# Patient Record
Sex: FEMALE | Race: WHITE | NOT HISPANIC OR LATINO | URBAN - METROPOLITAN AREA
[De-identification: names, ages, dates, MRNs, and addresses within clinical notes are randomized per-mention and may not be internally consistent; named-entity substitution may affect disease eponyms.]

---

## 2018-06-25 ENCOUNTER — EMERGENCY (EMERGENCY)
Facility: HOSPITAL | Age: 78
LOS: 1 days | Discharge: HOME | End: 2018-06-25
Attending: EMERGENCY MEDICINE
Payer: MEDICARE

## 2018-06-25 VITALS
HEART RATE: 68 BPM | TEMPERATURE: 99 F | DIASTOLIC BLOOD PRESSURE: 83 MMHG | OXYGEN SATURATION: 97 % | SYSTOLIC BLOOD PRESSURE: 166 MMHG | HEIGHT: 63 IN | WEIGHT: 214.95 LBS | RESPIRATION RATE: 18 BRPM

## 2018-06-25 VITALS
HEART RATE: 87 BPM | SYSTOLIC BLOOD PRESSURE: 141 MMHG | DIASTOLIC BLOOD PRESSURE: 80 MMHG | OXYGEN SATURATION: 100 % | RESPIRATION RATE: 19 BRPM | TEMPERATURE: 98 F

## 2018-06-25 PROCEDURE — 70450 CT HEAD/BRAIN W/O DYE: CPT

## 2018-06-25 PROCEDURE — 70486 CT MAXILLOFACIAL W/O DYE: CPT | Mod: 26

## 2018-06-25 PROCEDURE — 73610 X-RAY EXAM OF ANKLE: CPT | Mod: 26,RT

## 2018-06-25 PROCEDURE — 70486 CT MAXILLOFACIAL W/O DYE: CPT

## 2018-06-25 PROCEDURE — 99284 EMERGENCY DEPT VISIT MOD MDM: CPT

## 2018-06-25 PROCEDURE — 71250 CT THORAX DX C-: CPT

## 2018-06-25 PROCEDURE — 72125 CT NECK SPINE W/O DYE: CPT | Mod: 26

## 2018-06-25 PROCEDURE — 71110 X-RAY EXAM RIBS BIL 3 VIEWS: CPT | Mod: 26

## 2018-06-25 PROCEDURE — 70450 CT HEAD/BRAIN W/O DYE: CPT | Mod: 26

## 2018-06-25 PROCEDURE — 71110 X-RAY EXAM RIBS BIL 3 VIEWS: CPT

## 2018-06-25 PROCEDURE — 73610 X-RAY EXAM OF ANKLE: CPT

## 2018-06-25 PROCEDURE — 72125 CT NECK SPINE W/O DYE: CPT

## 2018-06-25 PROCEDURE — 71250 CT THORAX DX C-: CPT | Mod: 26

## 2018-06-25 PROCEDURE — 99284 EMERGENCY DEPT VISIT MOD MDM: CPT | Mod: 25

## 2018-06-25 RX ORDER — ACETAMINOPHEN 500 MG
650 TABLET ORAL ONCE
Qty: 0 | Refills: 0 | Status: COMPLETED | OUTPATIENT
Start: 2018-06-25 | End: 2018-06-25

## 2018-06-25 RX ORDER — TRAMADOL HYDROCHLORIDE 50 MG/1
1 TABLET ORAL
Qty: 12 | Refills: 0 | OUTPATIENT
Start: 2018-06-25 | End: 2018-06-27

## 2018-06-25 RX ORDER — TRAMADOL HYDROCHLORIDE 50 MG/1
50 TABLET ORAL ONCE
Qty: 0 | Refills: 0 | Status: DISCONTINUED | OUTPATIENT
Start: 2018-06-25 | End: 2018-06-25

## 2018-06-25 RX ADMIN — TRAMADOL HYDROCHLORIDE 50 MILLIGRAM(S): 50 TABLET ORAL at 17:16

## 2018-06-25 RX ADMIN — Medication 650 MILLIGRAM(S): at 15:37

## 2018-06-25 NOTE — ED PROVIDER NOTE - CHPI ED SYMPTOMS POS
facial injury, left lower lip, dry abrasion inner aspect of lip ,  right ankle pain with rom/ABRASION

## 2018-06-25 NOTE — ED PROVIDER NOTE - PROGRESS NOTE DETAILS
results discussed, no fx ankle and ribs, ct chest neg, ct head, maxfacial and cervical spine neg, copy of results given, rx for tramadol sent to pharmacy,  advised follow up with pmd , given information for Dr Soto,  ace wrap applied to right ankle, cane given,  advised if any concerns return to ed.

## 2018-06-25 NOTE — ED PROVIDER NOTE - OBJECTIVE STATEMENT
77 y female presents s/p trip and fall at home today,  states she tripped on carpet 77 y female presents s/p trip and fall at home today,  states she tripped on carpet at home, fell forward,  states she hit her face on carpet, left lower lower lip is swollen, has dry abrasion inner aspect, has bilateral rib pain with movement,  no active bleeding from lip, no laceration,  states she had brief episode of epistaxis from left nare, resolved spontaneously, no nausea, no vomiting, no chest pain, no sob, no abdominal pain, no back pain, has mild headache, right ankle pain with rom,   takes asa daily.  PMD Dr Mccloud

## 2018-06-25 NOTE — ED PROVIDER NOTE - CHPI ED SYMPTOMS NEG
no weakness/no fever/no confusion/no deformity/no loss of consciousness/no numbness/no bleeding/no chest pain, no sob/no vomiting

## 2018-06-25 NOTE — ED PROVIDER NOTE - CARE PLAN
Principal Discharge DX:	Fall, initial encounter  Secondary Diagnosis:	Chest wall muscle strain, initial encounter  Secondary Diagnosis:	Acute right ankle pain

## 2019-08-06 ENCOUNTER — APPOINTMENT (OUTPATIENT)
Dept: ORTHOPEDIC SURGERY | Facility: CLINIC | Age: 79
End: 2019-08-06
Payer: MEDICARE

## 2019-08-06 VITALS
SYSTOLIC BLOOD PRESSURE: 91 MMHG | BODY MASS INDEX: 38.07 KG/M2 | WEIGHT: 223 LBS | HEART RATE: 72 BPM | HEIGHT: 64 IN | DIASTOLIC BLOOD PRESSURE: 55 MMHG

## 2019-08-06 PROCEDURE — 73030 X-RAY EXAM OF SHOULDER: CPT | Mod: RT

## 2019-08-06 PROCEDURE — 99204 OFFICE O/P NEW MOD 45 MIN: CPT

## 2019-08-06 NOTE — PHYSICAL EXAM
[Normal] : Oriented to person, place, and time, insight and judgement were intact and the affect was normal [de-identified] : Right Shoulder Exam\par \par Inspection: No malalignment, No defects\par Skin: No masses, No lesions\par Neck: Negative Spurlings, full ROM without pain\par ROM: RIGHT Active FF to 60°, abduction to 40°, ER to 5°, IR to abdomen. Severe pain with any range of motion.\par Tenderness: No bicipital tenderness, no tenderness to the greater tuberosity/RTC insertion, no anterior shoulder/lesser tuberosity tenderness\par Strength: 5/5 ER, 5/5 IR in adduction, 5/5 supraspinatus testing\par AC Joint: No ttp, no pain with cross arm testing\par Vasc: 2+ radial pulse\par Neuro: AIN, PIN, Ulnar nerve in tact to motor\par Sensation: In tact to light touch throughout\par \par  [de-identified] : 3 x-ray views of the right shoulder were obtained. Patient has evidence of severe glenohumeral arthritis.

## 2019-08-06 NOTE — ASSESSMENT
[FreeTextEntry1] : the patient is a 78-year-old female with chronic right shoulder pain secondary to severe osteoarthritis/rotator cuff arthropathy. Risks and benefits of continued conservative treatment versus surgical intervention for a reverse rotator cuff arthroplasty were discussed at length with patient she would like to think about these options further. I recommend a CT scan to evaluate the quality of the glenoid. She will follow up to review the results of the CT scan and to discuss treatment options further.

## 2019-08-06 NOTE — HISTORY OF PRESENT ILLNESS
[FreeTextEntry1] : 08/06/2019: The patient is a 78-year-old right-hand-dominant female who presents to the office today with severe right shoulder pain that has been going on for almost 10 years. The pain is dull and achy in nature and associated with severe limitations in range of motion. She is having difficulty with most of her activities of daily living. Her pain even disturbs her sleep. She recently had an MRI this past May of the right shoulder showing a full rotator cuff tear with associated glenohumeral arthritis. She denies any paresthesias.

## 2019-08-10 ENCOUNTER — MOBILE ON CALL (OUTPATIENT)
Age: 79
End: 2019-08-10

## 2019-09-10 ENCOUNTER — APPOINTMENT (OUTPATIENT)
Dept: ORTHOPEDIC SURGERY | Facility: CLINIC | Age: 79
End: 2019-09-10
Payer: MEDICARE

## 2019-09-10 VITALS
BODY MASS INDEX: 38.07 KG/M2 | HEIGHT: 64 IN | SYSTOLIC BLOOD PRESSURE: 141 MMHG | DIASTOLIC BLOOD PRESSURE: 66 MMHG | HEART RATE: 59 BPM | WEIGHT: 223 LBS

## 2019-09-10 PROCEDURE — 99214 OFFICE O/P EST MOD 30 MIN: CPT

## 2019-09-10 NOTE — HISTORY OF PRESENT ILLNESS
[FreeTextEntry1] : 08/06/2019: The patient is a 78-year-old right-hand-dominant female who presents to the office today with severe right shoulder pain that has been going on for almost 10 years. The pain is dull and achy in nature and associated with severe limitations in range of motion. She is having difficulty with most of her activities of daily living. Her pain even disturbs her sleep. She recently had an MRI this past May of the right shoulder showing a full rotator cuff tear with associated glenohumeral arthritis. She denies any paresthesias.\par \par 9/10/19: the patient returns today for followup of her right shoulder pain. She continues to have severe sharp pain in the right shoulder that radiates to the upper arm. It is worse with activity and improved with rest. She had a CT scan and presents to discuss surgical options.

## 2019-09-10 NOTE — PHYSICAL EXAM
[Normal Finger/nose] : finger to nose coordination [Normal RUE] : Right Upper Extremity: No scars, rashes, lesions, ulcers, skin intact [de-identified] : Right Shoulder Exam\par \par Inspection: No malalignment, No defects\par Skin: No masses, No lesions\par Neck: Negative Spurlings, full ROM without pain\par ROM: RIGHT Active FF to 60°, abduction to 40°, ER to 5°, IR to abdomen. Severe pain with any range of motion.\par Tenderness: No bicipital tenderness, no tenderness to the greater tuberosity/RTC insertion, no anterior shoulder/lesser tuberosity tenderness\par Strength: 5/5 ER, 5/5 IR in adduction, 5/5 supraspinatus testing\par AC Joint: No ttp, no pain with cross arm testing\par Vasc: 2+ radial pulse\par Neuro: AIN, PIN, Ulnar nerve in tact to motor\par Sensation: In tact to light touch throughout\par \par  [Normal] : no peripheral adenopathy appreciated [de-identified] : CT scan of theright shoulder is reviewed there is severe degenerative changes of the glenohumeral joint consistent with rotator cuff arthropathy, there is no abnormal glenoid bone loss

## 2019-09-10 NOTE — ASSESSMENT
[FreeTextEntry1] : the patient is a 78-year-old female with chronic right shoulder pain secondary to advanced rotator cuff arthropathy. Risks and benefits of continued conservative treatment versus surgical intervention for a reverse total shoulder arthroplasty were discussed at length with the patient her family, she wishes to proceed with surgery. Prior to booking surgery she would like to followup with her pain management doctor for a possible epidural injection for low back pain. She will call the office toschedule the procedure.

## 2021-01-29 NOTE — ED PROVIDER NOTE - ATTENDING CONTRIBUTION TO CARE
[Negative] : Genitourinary pt s/p trip and fall with ankle pain and rib pain.  CT claire for rib fracture.  Xray neg for fracture.  splint, cane, ortho f/u. incentive spirometer provided

## 2021-02-18 NOTE — ED PROVIDER NOTE - GASTROINTESTINAL NEGATIVE STATEMENT, MLM
Writer received incoming fax stating PA is needed for venlafaxine (EFFEXOR-XR) 37.5 MG 24 hr capsule: Take 2 capsules (75 mg) by mouth daily.    Per provider's last office visit note: Please note Effexor XR is now ordered with 37.5 mg capsule, so please take 2 capsules daily.  This is to prepare tapering off of the medication.    Pt has appt today at 10:30 am with provider. Will confirm if pt has lowered dose and whether Rx can be adjusted at that time to avoid insurance issues.     ID: [id number]   no abdominal pain, no bloating, no constipation, no diarrhea, no nausea and no vomiting.

## 2021-03-12 ENCOUNTER — APPOINTMENT (OUTPATIENT)
Dept: ORTHOPEDIC SURGERY | Facility: CLINIC | Age: 81
End: 2021-03-12
Payer: MEDICARE

## 2021-03-12 DIAGNOSIS — I63.9 CEREBRAL INFARCTION, UNSPECIFIED: ICD-10-CM

## 2021-03-12 DIAGNOSIS — E11.9 TYPE 2 DIABETES MELLITUS W/OUT COMPLICATIONS: ICD-10-CM

## 2021-03-12 DIAGNOSIS — I10 ESSENTIAL (PRIMARY) HYPERTENSION: ICD-10-CM

## 2021-03-12 DIAGNOSIS — M19.011 PRIMARY OSTEOARTHRITIS, RIGHT SHOULDER: ICD-10-CM

## 2021-03-12 DIAGNOSIS — M25.522 PAIN IN LEFT ELBOW: ICD-10-CM

## 2021-03-12 PROCEDURE — 73080 X-RAY EXAM OF ELBOW: CPT | Mod: 26,LT

## 2021-03-12 PROCEDURE — 99213 OFFICE O/P EST LOW 20 MIN: CPT

## 2021-03-12 NOTE — HISTORY OF PRESENT ILLNESS
[FreeTextEntry1] : 08/06/2019: The patient is a 78-year-old right-hand-dominant female who presents to the office today with severe right shoulder pain that has been going on for almost 10 years. The pain is dull and achy in nature and associated with severe limitations in range of motion. She is having difficulty with most of her activities of daily living. Her pain even disturbs her sleep. She recently had an MRI this past May of the right shoulder showing a full rotator cuff tear with associated glenohumeral arthritis. She denies any paresthesias.\par \par 9/10/19: the patient returns today for followup of her right shoulder pain. She continues to have severe sharp pain in the right shoulder that radiates to the upper arm. It is worse with activity and improved with rest. She had a CT scan and presents to discuss surgical options.\par \par 3/12/21: Patient returns for reevaluation of her right shoulder. She was last seen in 2019, and recommended for consideration for surgical intervention, with a reverse right shoulder arthroplasty. She reports she cancelled her surgery at that time, due to personal/family issues. She reports she would like to consider surgical intervention at this time, due to reports of constant pain, severe stiffness and limitations affecting her ADLs. \par She also reports she took a fall a couple days prior, and hit her medial left elbow against a door. She reports resolving ecchymosis, and mild pain. She denies restriction in motion/strength of the left elbow, denies paraesthesias.

## 2021-03-12 NOTE — PHYSICAL EXAM
[Normal RUE] : Right Upper Extremity: No scars, rashes, lesions, ulcers, skin intact [Normal Finger/nose] : finger to nose coordination [Normal] : no peripheral adenopathy appreciated [de-identified] : Right Shoulder Exam\par \par Inspection: No malalignment, No defects\par Skin: No masses, No lesions\par Neck: Negative Spurlings, full ROM without pain\par ROM: RIGHT Active FF to 50°, abduction to 30°, ER to 5°, IR to abdomen. Severe pain with any range of motion.\par Tenderness: No bicipital tenderness, no tenderness to the greater tuberosity/RTC insertion, no anterior shoulder/lesser tuberosity tenderness\par Strength: 5/5 ER, 5/5 IR in adduction, 4/5 supraspinatus testing\par AC Joint: No ttp, no pain with cross arm testing\par Vasc: 2+ radial pulse\par Neuro: AIN, PIN, Ulnar nerve in tact to motor\par Sensation: In tact to light touch throughout\par \par Left elbow exam\par Skin: Clean, dry, intact. mild resolving ecchymosis localized to the medial aspect of the elbow. No swelling. No palpable joint effusion.\par ROM: Full range of motion of bilateral elbows in flexion extension supination and pronation. \par Painful ROM: None\par Tenderness: no medial epicondyle pain. No lateral epicondyle pain. No olecranon pain. No pain at radial head.\par Strength: 5/5 elbow flexion, 5/5 elbow extension, 5/5 supination, 5/5 pronation\par Stability: Stable to varus/valgus stress\par Vasc: 2+ radial pulse, <2s cap refill\par Sensation: In tact to light touch throughout\par Neuro: Negative tinels at ulnar canal, AIN/PIN/Ulnar nerve in tact to motor/sensation.\par \par \par  [de-identified] : CT scan of the right shoulder is reviewed there is severe degenerative changes of the glenohumeral joint consistent with rotator cuff arthropathy, there is no abnormal glenoid bone loss\par \par Three views of the left elbow taken today reveal no acute fracture.

## 2021-03-12 NOTE — ASSESSMENT
[FreeTextEntry1] : 80-year-old female with chronic right shoulder pain secondary to advanced rotator cuff arthropathy and glenohumeral arthritis. Risks and benefits of continued conservative treatment versus surgical intervention for a reverse total shoulder arthroplasty were discussed at length with the patient. she wishes to proceed with surgery. \par Prior to booking surgery she will follow up in the beginning of May 20 with Dr. Kwan for preop evaluation. She will also discuss with her pain management doctor plan for surgery and evaluation of her neck and back.\par concerning her elbow she has been advised that this is a soft tissue contusion. She already notes improvement in her symptoms. She will followup as needed for the elbow.\par We will see her in May 2021 for consideration for reverse total shoulder replacement.

## 2021-05-25 ENCOUNTER — EMERGENCY (EMERGENCY)
Facility: HOSPITAL | Age: 81
LOS: 1 days | Discharge: HOME | End: 2021-05-25
Attending: EMERGENCY MEDICINE | Admitting: EMERGENCY MEDICINE
Payer: MEDICARE

## 2021-05-25 VITALS
RESPIRATION RATE: 17 BRPM | DIASTOLIC BLOOD PRESSURE: 77 MMHG | HEART RATE: 77 BPM | SYSTOLIC BLOOD PRESSURE: 137 MMHG | OXYGEN SATURATION: 97 %

## 2021-05-25 VITALS
HEART RATE: 60 BPM | OXYGEN SATURATION: 100 % | DIASTOLIC BLOOD PRESSURE: 82 MMHG | RESPIRATION RATE: 16 BRPM | SYSTOLIC BLOOD PRESSURE: 144 MMHG | TEMPERATURE: 98 F | HEIGHT: 63 IN

## 2021-05-25 PROCEDURE — 99284 EMERGENCY DEPT VISIT MOD MDM: CPT | Mod: 25

## 2021-05-25 PROCEDURE — 96375 TX/PRO/DX INJ NEW DRUG ADDON: CPT

## 2021-05-25 PROCEDURE — 99284 EMERGENCY DEPT VISIT MOD MDM: CPT

## 2021-05-25 PROCEDURE — 96374 THER/PROPH/DIAG INJ IV PUSH: CPT

## 2021-05-25 PROCEDURE — 82962 GLUCOSE BLOOD TEST: CPT

## 2021-05-25 RX ORDER — ONDANSETRON 8 MG/1
4 TABLET, FILM COATED ORAL ONCE
Refills: 0 | Status: DISCONTINUED | OUTPATIENT
Start: 2021-05-25 | End: 2021-05-28

## 2021-05-25 RX ORDER — KETOROLAC TROMETHAMINE 30 MG/ML
15 SYRINGE (ML) INJECTION ONCE
Refills: 0 | Status: DISCONTINUED | OUTPATIENT
Start: 2021-05-25 | End: 2021-05-25

## 2021-05-25 RX ORDER — DEXAMETHASONE 0.5 MG/5ML
5 ELIXIR ORAL ONCE
Refills: 0 | Status: COMPLETED | OUTPATIENT
Start: 2021-05-25 | End: 2021-05-25

## 2021-05-25 RX ORDER — HYDROMORPHONE HYDROCHLORIDE 2 MG/ML
0.5 INJECTION INTRAMUSCULAR; INTRAVENOUS; SUBCUTANEOUS ONCE
Refills: 0 | Status: DISCONTINUED | OUTPATIENT
Start: 2021-05-25 | End: 2021-05-25

## 2021-05-25 RX ADMIN — Medication 4 MILLIGRAM(S): at 12:02

## 2021-05-25 RX ADMIN — HYDROMORPHONE HYDROCHLORIDE 0.5 MILLIGRAM(S): 2 INJECTION INTRAMUSCULAR; INTRAVENOUS; SUBCUTANEOUS at 12:02

## 2021-05-25 RX ADMIN — Medication 15 MILLIGRAM(S): at 12:02

## 2021-05-25 NOTE — ED PROVIDER NOTE - CHPI ED SYMPTOMS NEG
no anorexia/no bladder dysfunction/no bowel dysfunction/no numbness/no tingling/no motor function loss

## 2021-05-25 NOTE — ED PROVIDER NOTE - CCCP TRG CHIEF CMPLNT
Hx of urinary retention and chronic UTI  - Requiring straight cath, can place harding   - c/w home bethanechol. Hold pyridium. back pain/injury

## 2021-05-25 NOTE — ED ADULT NURSE NOTE - NSIMPLEMENTINTERV_GEN_ALL_ED
Implemented All Fall with Harm Risk Interventions:  [place] to call system. Call bell, personal items and telephone within reach. Instruct patient to call for assistance. Room bathroom lighting operational. Non-slip footwear when patient is off stretcher. Physically safe environment: no spills, clutter or unnecessary equipment. Stretcher in lowest position, wheels locked, appropriate side rails in place. Provide visual cue, wrist band, yellow gown, etc. Monitor gait and stability. Monitor for mental status changes and reorient to person, place, and time. Review medications for side effects contributing to fall risk. Reinforce activity limits and safety measures with patient and family. Provide visual clues: red socks.

## 2021-05-25 NOTE — ED PROVIDER NOTE - CARE PLAN
Principal Discharge DX:	Low back pain without sciatica, unspecified back pain laterality, unspecified chronicity

## 2021-05-25 NOTE — ED ADULT NURSE NOTE - OBJECTIVE STATEMENT
Mid/lower back pain chronic.  Worsening lately.  No loss of bowel/bladder control.  No radiating to lower legs

## 2021-05-25 NOTE — ED PROVIDER NOTE - OBJECTIVE STATEMENT
81 yo white female with chronic back pain related to compression Fx at T11 as well as disc herniations lower lumbar spine now here for evaluation and management of worsening/flare of same pain in same location w/o any new symptoms. Was seen at Pain Management last week and started on Tramadol.

## 2021-05-25 NOTE — ED PROVIDER NOTE - PATIENT PORTAL LINK FT
You can access the FollowMyHealth Patient Portal offered by [facility] by registering at the following website: [email or web address]. By joining [facility]’s FollowMyHealth portal, you will also be able to view your health information using other applications (apps) compatible with our system.

## 2021-05-25 NOTE — ED PROVIDER NOTE - CARE PROVIDER_API CALL
Ermias Samuel (MD)  Anesthesiology; Pain Medicine  [address]  [place] [address]  Phone: [phone]  Fax: [phone]  Follow Up Time:

## 2021-06-28 ENCOUNTER — OUTPATIENT (OUTPATIENT)
Dept: OUTPATIENT SERVICES | Facility: HOSPITAL | Age: 81
LOS: 1 days | End: 2021-06-28
Payer: MEDICARE

## 2021-06-28 DIAGNOSIS — M54.16 RADICULOPATHY, LUMBAR REGION: ICD-10-CM

## 2021-06-28 LAB — GLUCOSE BLDC GLUCOMTR-MCNC: 105 MG/DL — HIGH (ref 70–99)

## 2021-06-28 PROCEDURE — 62323 NJX INTERLAMINAR LMBR/SAC: CPT

## 2021-06-28 PROCEDURE — 82962 GLUCOSE BLOOD TEST: CPT

## 2021-08-25 NOTE — ED ADULT TRIAGE NOTE - DOMESTIC TRAVEL HIGH RISK QUESTION
Ipledge Number (Optional): [id number] Patient Reported Weight (Optional - Include Units): 160 No Detail Level: Zone Anticipated Starting Dosage (Optional): 40mg Daily

## 2022-02-09 ENCOUNTER — EMERGENCY (EMERGENCY)
Facility: HOSPITAL | Age: 82
LOS: 1 days | Discharge: HOME | End: 2022-02-09
Attending: EMERGENCY MEDICINE
Payer: MEDICARE

## 2022-02-09 VITALS
DIASTOLIC BLOOD PRESSURE: 76 MMHG | SYSTOLIC BLOOD PRESSURE: 128 MMHG | WEIGHT: 192.9 LBS | HEART RATE: 61 BPM | OXYGEN SATURATION: 96 % | HEIGHT: 63 IN | TEMPERATURE: 98 F | RESPIRATION RATE: 18 BRPM

## 2022-02-09 PROCEDURE — 99284 EMERGENCY DEPT VISIT MOD MDM: CPT

## 2022-02-10 VITALS
TEMPERATURE: 98 F | HEART RATE: 63 BPM | SYSTOLIC BLOOD PRESSURE: 128 MMHG | OXYGEN SATURATION: 96 % | DIASTOLIC BLOOD PRESSURE: 69 MMHG | RESPIRATION RATE: 18 BRPM

## 2022-02-10 LAB
ALBUMIN SERPL ELPH-MCNC: 3.3 G/DL — SIGNIFICANT CHANGE UP (ref 3.3–5)
ALP SERPL-CCNC: 86 U/L — SIGNIFICANT CHANGE UP (ref 40–120)
ALT FLD-CCNC: 11 U/L — SIGNIFICANT CHANGE UP (ref 10–45)
ANION GAP SERPL CALC-SCNC: 10 MMOL/L — SIGNIFICANT CHANGE UP (ref 5–17)
AST SERPL-CCNC: 11 U/L — SIGNIFICANT CHANGE UP (ref 10–40)
BASE EXCESS BLDV CALC-SCNC: 7.5 MMOL/L — HIGH (ref -2–2)
BASOPHILS # BLD AUTO: 0.03 K/UL — SIGNIFICANT CHANGE UP (ref 0–0.2)
BASOPHILS NFR BLD AUTO: 0.3 % — SIGNIFICANT CHANGE UP (ref 0–2)
BILIRUB SERPL-MCNC: 0.3 MG/DL — SIGNIFICANT CHANGE UP (ref 0.2–1.2)
BUN SERPL-MCNC: 24 MG/DL — HIGH (ref 7–23)
CA-I SERPL-SCNC: 1.28 MMOL/L — SIGNIFICANT CHANGE UP (ref 1.15–1.33)
CALCIUM SERPL-MCNC: 9.6 MG/DL — SIGNIFICANT CHANGE UP (ref 8.4–10.5)
CHLORIDE BLDV-SCNC: 99 MMOL/L — SIGNIFICANT CHANGE UP (ref 96–108)
CHLORIDE SERPL-SCNC: 100 MMOL/L — SIGNIFICANT CHANGE UP (ref 96–108)
CO2 BLDV-SCNC: 36 MMOL/L — HIGH (ref 22–26)
CO2 SERPL-SCNC: 27 MMOL/L — SIGNIFICANT CHANGE UP (ref 22–31)
CREAT SERPL-MCNC: 1.8 MG/DL — HIGH (ref 0.5–1.3)
EOSINOPHIL # BLD AUTO: 0.2 K/UL — SIGNIFICANT CHANGE UP (ref 0–0.5)
EOSINOPHIL NFR BLD AUTO: 2 % — SIGNIFICANT CHANGE UP (ref 0–6)
GAS PNL BLDV: 136 MMOL/L — SIGNIFICANT CHANGE UP (ref 136–145)
GAS PNL BLDV: SIGNIFICANT CHANGE UP
GLUCOSE BLDV-MCNC: 311 MG/DL — HIGH (ref 70–99)
GLUCOSE SERPL-MCNC: 312 MG/DL — HIGH (ref 70–99)
HCO3 BLDV-SCNC: 34 MMOL/L — HIGH (ref 22–29)
HCT VFR BLD CALC: 34.6 % — SIGNIFICANT CHANGE UP (ref 34.5–45)
HCT VFR BLDA CALC: 34 % — LOW (ref 34.5–46.5)
HGB BLD CALC-MCNC: 11.3 G/DL — LOW (ref 11.7–16.1)
HGB BLD-MCNC: 11.2 G/DL — LOW (ref 11.5–15.5)
IMM GRANULOCYTES NFR BLD AUTO: 0.4 % — SIGNIFICANT CHANGE UP (ref 0–1.5)
LACTATE BLDV-MCNC: 1 MMOL/L — SIGNIFICANT CHANGE UP (ref 0.7–2)
LYMPHOCYTES # BLD AUTO: 1.8 K/UL — SIGNIFICANT CHANGE UP (ref 1–3.3)
LYMPHOCYTES # BLD AUTO: 17.8 % — SIGNIFICANT CHANGE UP (ref 13–44)
MCHC RBC-ENTMCNC: 29.3 PG — SIGNIFICANT CHANGE UP (ref 27–34)
MCHC RBC-ENTMCNC: 32.4 GM/DL — SIGNIFICANT CHANGE UP (ref 32–36)
MCV RBC AUTO: 90.6 FL — SIGNIFICANT CHANGE UP (ref 80–100)
MONOCYTES # BLD AUTO: 0.84 K/UL — SIGNIFICANT CHANGE UP (ref 0–0.9)
MONOCYTES NFR BLD AUTO: 8.3 % — SIGNIFICANT CHANGE UP (ref 2–14)
NEUTROPHILS # BLD AUTO: 7.18 K/UL — SIGNIFICANT CHANGE UP (ref 1.8–7.4)
NEUTROPHILS NFR BLD AUTO: 71.2 % — SIGNIFICANT CHANGE UP (ref 43–77)
NRBC # BLD: 0 /100 WBCS — SIGNIFICANT CHANGE UP (ref 0–0)
PCO2 BLDV: 56 MMHG — HIGH (ref 39–42)
PH BLDV: 7.39 — SIGNIFICANT CHANGE UP (ref 7.32–7.43)
PLATELET # BLD AUTO: 241 K/UL — SIGNIFICANT CHANGE UP (ref 150–400)
PO2 BLDV: 18 MMHG — LOW (ref 25–45)
POTASSIUM BLDV-SCNC: 3.4 MMOL/L — LOW (ref 3.5–5.1)
POTASSIUM SERPL-MCNC: 3.6 MMOL/L — SIGNIFICANT CHANGE UP (ref 3.5–5.3)
POTASSIUM SERPL-SCNC: 3.6 MMOL/L — SIGNIFICANT CHANGE UP (ref 3.5–5.3)
PROT SERPL-MCNC: 6.3 G/DL — SIGNIFICANT CHANGE UP (ref 6–8.3)
RBC # BLD: 3.82 M/UL — SIGNIFICANT CHANGE UP (ref 3.8–5.2)
RBC # FLD: 12.4 % — SIGNIFICANT CHANGE UP (ref 10.3–14.5)
SAO2 % BLDV: 32.4 % — LOW (ref 67–88)
SARS-COV-2 RNA SPEC QL NAA+PROBE: SIGNIFICANT CHANGE UP
SODIUM SERPL-SCNC: 137 MMOL/L — SIGNIFICANT CHANGE UP (ref 135–145)
WBC # BLD: 10.09 K/UL — SIGNIFICANT CHANGE UP (ref 3.8–10.5)
WBC # FLD AUTO: 10.09 K/UL — SIGNIFICANT CHANGE UP (ref 3.8–10.5)

## 2022-02-10 PROCEDURE — 83605 ASSAY OF LACTIC ACID: CPT

## 2022-02-10 PROCEDURE — 82330 ASSAY OF CALCIUM: CPT

## 2022-02-10 PROCEDURE — 87635 SARS-COV-2 COVID-19 AMP PRB: CPT

## 2022-02-10 PROCEDURE — 84295 ASSAY OF SERUM SODIUM: CPT

## 2022-02-10 PROCEDURE — 74176 CT ABD & PELVIS W/O CONTRAST: CPT | Mod: MA

## 2022-02-10 PROCEDURE — 73501 X-RAY EXAM HIP UNI 1 VIEW: CPT

## 2022-02-10 PROCEDURE — 85018 HEMOGLOBIN: CPT

## 2022-02-10 PROCEDURE — 85025 COMPLETE CBC W/AUTO DIFF WBC: CPT

## 2022-02-10 PROCEDURE — 82947 ASSAY GLUCOSE BLOOD QUANT: CPT

## 2022-02-10 PROCEDURE — 99284 EMERGENCY DEPT VISIT MOD MDM: CPT | Mod: 25

## 2022-02-10 PROCEDURE — 80053 COMPREHEN METABOLIC PANEL: CPT

## 2022-02-10 PROCEDURE — 72192 CT PELVIS W/O DYE: CPT | Mod: 26,MA,59

## 2022-02-10 PROCEDURE — 76377 3D RENDER W/INTRP POSTPROCES: CPT

## 2022-02-10 PROCEDURE — 84132 ASSAY OF SERUM POTASSIUM: CPT

## 2022-02-10 PROCEDURE — 85014 HEMATOCRIT: CPT

## 2022-02-10 PROCEDURE — 82435 ASSAY OF BLOOD CHLORIDE: CPT

## 2022-02-10 PROCEDURE — 82803 BLOOD GASES ANY COMBINATION: CPT

## 2022-02-10 PROCEDURE — 76377 3D RENDER W/INTRP POSTPROCES: CPT | Mod: 26

## 2022-02-10 PROCEDURE — 73501 X-RAY EXAM HIP UNI 1 VIEW: CPT | Mod: 26,RT

## 2022-02-10 PROCEDURE — 74176 CT ABD & PELVIS W/O CONTRAST: CPT | Mod: 26,MA

## 2022-02-10 RX ORDER — LIDOCAINE 4 G/100G
1 CREAM TOPICAL ONCE
Refills: 0 | Status: COMPLETED | OUTPATIENT
Start: 2022-02-10 | End: 2022-02-10

## 2022-02-10 RX ORDER — ACETAMINOPHEN 500 MG
2 TABLET ORAL
Qty: 32 | Refills: 0
Start: 2022-02-10 | End: 2022-02-13

## 2022-02-10 RX ORDER — OXYCODONE HYDROCHLORIDE 5 MG/1
1 TABLET ORAL
Qty: 7 | Refills: 0
Start: 2022-02-10 | End: 2022-02-12

## 2022-02-10 RX ORDER — ACETAMINOPHEN 500 MG
975 TABLET ORAL ONCE
Refills: 0 | Status: COMPLETED | OUTPATIENT
Start: 2022-02-10 | End: 2022-02-10

## 2022-02-10 RX ORDER — OXYCODONE HYDROCHLORIDE 5 MG/1
5 TABLET ORAL ONCE
Refills: 0 | Status: DISCONTINUED | OUTPATIENT
Start: 2022-02-10 | End: 2022-02-10

## 2022-02-10 RX ORDER — IBUPROFEN 200 MG
1 TABLET ORAL
Qty: 12 | Refills: 0
Start: 2022-02-10 | End: 2022-02-12

## 2022-02-10 RX ADMIN — OXYCODONE HYDROCHLORIDE 5 MILLIGRAM(S): 5 TABLET ORAL at 04:08

## 2022-02-10 RX ADMIN — LIDOCAINE 1 PATCH: 4 CREAM TOPICAL at 04:06

## 2022-02-10 RX ADMIN — Medication 975 MILLIGRAM(S): at 00:40

## 2022-02-10 NOTE — ED PROVIDER NOTE - PHYSICAL EXAMINATION
G: cooperative with exam   H: NCAT  E: EOMI, no conjunctival pallor   M: Mucous membranes moist   R: CTABL, nWOB  C: Nl S1/S2, no mrg  A: Soft, NT/ND, no rebound/guarding   MSK: no calf tenderness, no LE edema. Point TTP over sacral region, no erythema/bony deformity. Rectum w hemorrhoids

## 2022-02-10 NOTE — ED PROVIDER NOTE - NSFOLLOWUPINSTRUCTIONS_ED_ALL_ED_FT
-- Rest, ice, elevate area.  Apply ice to the area for 10 minutes every 2 hours for the first 2 days after the injury to reduce swelling.  -- If you have any worsening of symptoms, including severe pain/swelling/redness/numbness/changes in sensation/weakness/paralysis or any other concerns please return to the Emergency Department immediately.  -- Please follow up with your doctor(s) within the next 3 days, but seek medical care sooner if your symptoms persist or worsen.  Please call as soon as possible for an appointment.  If you cannot follow up with your doctor please return to the Emergency Department for any urgent issues.  -- You were given a copy of the results from any tests performed today in the Emergency Department which have results available.  Show these to your doctor(s).   Some of the tests we sent may not have results yet so please call or have your doctor call the Emergency Department to follow up on all results.  -- Please continue taking your home medications as directed.  Do not use alcohol when taking any medication (especially antibiotics, tylenol or other pain medication) unless you check with the doctor or pharmacist.    You can use 500-1000mg Tylenol every 6 hours for pain - as needed.  This is an over-the-counter medications - please respect the warnings on the label. This medication come with certain risks and side effects that you need to discuss with your doctor, especially if you are taking it for a prolonged period.    You can use 400-600mg Ibuprofen (such as motrin or advil) every 6 to 8 hours as needed for pain control.  Take ibuprofen with food or milk to lessen stomach upset.  This is an over-the-counter medication please respect the warnings on the label. All medications come with certain risks and side effects that you need to discuss with your doctor, especially if you are taking them for a prolonged period.    -- We have sent a prescription for Oxycodone directly to your pharmacy.  Please  the prescription as soon as possible & use as directed (1 pill every 6 hours ONLY as needed for SEVERE pain).  Try to use the Oxycodone as infrequently as possible - it's a narcotic & potentially addicting, especially if not taken as prescribed.  -- The narcotic in Oxycodone may cause drowsiness so don't drive, operate machinery or make important decisions while on this medication.  -- Oxycodone very often causes constipation.  So stay hydrated when taking Oxycodone. Your'e encouraged to use the stool softener Colace (also called docusate sodium – can be purchased without a prescription) 100mg 3 times a day to avoid constipation.

## 2022-02-10 NOTE — ED PROVIDER NOTE - ATTENDING CONTRIBUTION TO CARE
MD Torres:  patient seen and evaluated personally.   I agree with the History & Physical,  Impression & Plan other than what was detailed in my note.  MD Torres  80 y/o f MD Torres:  patient seen and evaluated personally.   I agree with the History & Physical,  Impression & Plan other than what was detailed in my note.  MD Torres  82 y/o f not on blood thinners s/p fall 1 week ago, mechanical, states slipped on sock, saw pcp s/p x rays but having persistent pain in between gluteal cleft, same pain since fall, workup was negative. states she did hit head but did not pass out, no ha, n/v, no neck pain, no back pain elsewhere, her pain is worse w/ movement, able to ambulate, sometimes uses a walker, afebrile vitals stable  non toxic well appearing, NC/AT no max face ttp,  conjunctiva non conjected, sclera anicteric PERRL, moist mucous membranes, neck supple, no midline c/t/l/spine ttp,  pt does have ttp over coccyx, heart sounds, normal, no mrg, lungs cta b/l no wrr, no chest ttp,  abd soft non distended w/ no tenderness, pelvis stable, no visual deformities of extremities, from of all joints, no ttp, axox3,  normal mood and affect, given pain pt is reporting in setting of neg x ray will get ct abd pelvis, pt also had some bruising and ttp over hip, likely muscular but will get x ray hip as well. re evaluate pain meds

## 2022-02-10 NOTE — ED PROVIDER NOTE - PROGRESS NOTE DETAILS
Payton Foreman MD (PGY2) -  Pt seen & reassessed.  Pt symptomatically improved.  NAD. Spoke to son and patient independently, they both voice understanding of clinical findings today.  We discussed the results of ED w/u w/patient (incl. presumptive Emergency Department dx, associated anticipatory guidance, stressing importance of prompt f/u, return precautions), & gave them a copy of results.  Patient verbalized understanding of ED course & agreed with our f/u recommendations, has decisional making capacity.  Pt st they will f/u w/PMD within the next 3 days; pt agrees to call today or tomorrow for an appointment. Pt agrees to return to the ED if there is any worsening or concerning symptoms. Attending Melissa:  spoke w/ radiology, sacral frx is minimaly displaced roughly 6 mm, pt pain improved, able to ambulate will give meds for pain control, have fu w/ spine

## 2022-02-10 NOTE — ED PROVIDER NOTE - OBJECTIVE STATEMENT
82yo F PMH CKD, DM, HTN presents d/t pain in her buttock. States she sustained a fall 1 wk ago. Tripped over her walker at that time. Went to PCP who obtained plain films, no fractures at that time. Has had increasing difficulty walking since then. No F/C/NS/N/V/D.

## 2022-02-10 NOTE — ED PROVIDER NOTE - PATIENT PORTAL LINK FT
You can access the FollowMyHealth Patient Portal offered by [facility] by registering at the following website: [email or web address]. By joining [facility]’s FollowMyHealth portal, you will also be able to view your health information using other applications (apps) compatible with our system.

## 2022-02-10 NOTE — ED PROVIDER NOTE - NS ED ROS FT
Gen: No F/C/NS  Head: +fall  Eyes: No changes in vision   Resp: No cough or trouble breathing  Cardiovascular: No chest pain    Gastroenteric: No N/V/D  :  No change in urine output, dysuria or hematuria   MS: +buttock pain   Neuro: No headache   Skin: No new rash

## 2022-02-10 NOTE — ED PROVIDER NOTE - SPECIALTY CARE
Headache, Unspecified    Headaches can be caused by a number of things. The cause of your headache isn’t clear. But it doesn’t seem to be a sign of any serious illness.  You could have a tension headache or a migraine headache.  Stress can cause a tension headache. This can happen if you tense the muscles of your shoulders, neck, and scalp without knowing it. If this stress lasts long enough, you may develop a tension headache.  It is not clear why migraines occur, but transient factors called\" triggers\" can raise the risk of having a migraine attack. Migraine triggers may include emotional stress or depression, or by hormone changes during the menstrual cycle. Other triggers include birth control pills and other medicines, alcohol or caffeine, foods with tyramine, eye strain, weather changes, missed meals, and lack of sleep or oversleeping.  Other causes of headache include:  · Viral illness with high fever  · Head injury with concussion  · Sinus, ear, or throat infection  · Dental pain and jaw joint (TMJ) pain  More serious but less common causes of headache include stroke, brain hemorrhage, brain tumor, meningitis, and encephalitis.  Home care  Follow these tips when taking care of yourself at home:  · Don’t drive yourself home if you were given pain medicine for your headache. Instead, have someone else drive you home. Try to sleep when you get home. You should feel much better when you wake up.  · Apply heat to the back of your neck to ease a neck muscle spasm. Take care of a migraine headache by putting an ice pack on your forehead or at the base of your skull.  · If you have nausea or vomiting, eat a light diet until your headache eases.  · If you have a migraine headache, use sunglasses when in the daylight or around bright indoor lighting until your symptoms get better. Bright glaring light can make this type of headache worse.  Follow-up care  Follow up with your health care provider if your headache  doesn’t get better within the next 24 hours. Talk with your provider If you have frequent headaches. He or she can help figure out a treatment plan. By knowing the earliest signs of headache, and starting treatment right away, you may be able to stop the pain yourself.  When to seek medical advice  Call your health care provider right away if any of these occur:  · Your head pain gets worse  · Your head pain doesn’t get better within 24 hours  · You aren’t able to keep liquids down (repeated vomiting)  · Fever of 100.4ºF (38ºC) or higher, or as directed by your health care provider  · Stiff neck  · Extreme drowsiness, confusion, or fainting  · Dizziness or dizziness with spinning sensation (vertigo)  · Weakness in an arm or leg or one side of your face  · You have difficulty talking or seeing  © 6455-0088 [employer]. [address] [place] [address], [place] [address]. All rights reserved. This information is not intended as a substitute for professional medical care. Always follow your healthcare professional's instructions.         Gastroenterology

## 2022-02-10 NOTE — ED PROVIDER NOTE - NSFOLLOWUPCLINICS_GEN_ALL_ED_FT
[facility]  Gastroenterology  [address], [facility] [address]  [place] [address]  Phone: [phone]  Fax:     [facility]  Orthopedic Surgery  [address]  [place] [address]  Phone: [phone]  Fax:

## 2022-02-10 NOTE — ED ADULT NURSE NOTE - OBJECTIVE STATEMENT
patient is an 80 y/o F with hx of HTN, HLD, and DM BIBEMS from assisted living facility c/o pain s/p mechanical fall 1 week ago. patient states that she tripped over her walker ~1 week ago and landed on her butt. patient has been experiencing pain and was seen outpatient by her PCP and XRays of her back and buttocks were negative. patient states that she has been having difficulty ambulating and performing her ADLs. patient states that she normally ambulates in her room with walker however has not been able to do so. patient is a&ox4, PERRL. strong peripheral pulses, strong extremities x4. respirations even and unlabored with symmetrical chest rise. abdomen soft and nondistended. skin warm, dry, and intact. patient resting in NAD. CLINE. IV access obtained. MD Martínez at bedside to assess. patient updated on plan of care. bed locked and placed in lowest position with side rails up and call bell within reach. comfort and safety maintained

## 2022-02-10 NOTE — ED PROVIDER NOTE - CLINICAL SUMMARY MEDICAL DECISION MAKING FREE TEXT BOX
80yo F PMH CKD, DM, HTN presents d/t pain in her buttock. Point tenderness over sacral region. Plan to obtain CT, labs, reassess.

## 2022-02-11 NOTE — ED POST DISCHARGE NOTE - RESULT SUMMARY
Incidental/recommended value f/u. CT findings noted as listed. Per ED documentation all results were d/w pt and son. Pt given ortho f/u for spine findings and GI referral/f/u for further eval of the possibly proctitis. Further ED contact at this time would not change recommendations already given. - Sacha Wahl PA-C

## 2022-03-21 ENCOUNTER — EMERGENCY (EMERGENCY)
Facility: HOSPITAL | Age: 82
LOS: 1 days | Discharge: HOME | End: 2022-03-21
Attending: EMERGENCY MEDICINE
Payer: MEDICARE

## 2022-03-21 VITALS
HEIGHT: 63 IN | SYSTOLIC BLOOD PRESSURE: 161 MMHG | OXYGEN SATURATION: 97 % | TEMPERATURE: 98 F | DIASTOLIC BLOOD PRESSURE: 76 MMHG | WEIGHT: 149.91 LBS | HEART RATE: 70 BPM | RESPIRATION RATE: 16 BRPM

## 2022-03-21 VITALS
SYSTOLIC BLOOD PRESSURE: 160 MMHG | TEMPERATURE: 98 F | OXYGEN SATURATION: 95 % | RESPIRATION RATE: 16 BRPM | HEART RATE: 66 BPM | DIASTOLIC BLOOD PRESSURE: 77 MMHG

## 2022-03-21 PROCEDURE — 72170 X-RAY EXAM OF PELVIS: CPT

## 2022-03-21 PROCEDURE — 73562 X-RAY EXAM OF KNEE 3: CPT | Mod: 26,RT

## 2022-03-21 PROCEDURE — 70450 CT HEAD/BRAIN W/O DYE: CPT | Mod: MA

## 2022-03-21 PROCEDURE — 73562 X-RAY EXAM OF KNEE 3: CPT

## 2022-03-21 PROCEDURE — 72170 X-RAY EXAM OF PELVIS: CPT | Mod: 26

## 2022-03-21 PROCEDURE — 99284 EMERGENCY DEPT VISIT MOD MDM: CPT | Mod: 25

## 2022-03-21 PROCEDURE — 99284 EMERGENCY DEPT VISIT MOD MDM: CPT | Mod: FS

## 2022-03-21 PROCEDURE — 70450 CT HEAD/BRAIN W/O DYE: CPT | Mod: 26,MA

## 2022-03-21 RX ORDER — ACETAMINOPHEN 500 MG
975 TABLET ORAL ONCE
Refills: 0 | Status: COMPLETED | OUTPATIENT
Start: 2022-03-21 | End: 2022-03-21

## 2022-03-21 RX ADMIN — Medication 975 MILLIGRAM(S): at 20:29

## 2022-03-21 NOTE — ED PROVIDER NOTE - PROGRESS NOTE DETAILS
CT brain revealed no acute findings. 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 Discussed these with patient. Cammy Beebe PA-C Attending MD Myers: Patient re-evaluated and feeling improved.  No acute issues at  this time.  Radiology tests reviewed with patient including incidental findings.  Patient stable for discharge. Follow up instructions given, importance of follow up emphasized, return to ED parameters reviewed and patient verbalized understanding.  All questions answered, all concerns addressed.

## 2022-03-21 NOTE — ED PROVIDER NOTE - PATIENT PORTAL LINK FT
You can access the FollowMyHealth Patient Portal offered by [facility] by registering at the following website: [email or web address]. By joining [facility]’s FollowMyHealth portal, you will also be able to view your health information using other applications (apps) compatible with our system.

## 2022-03-21 NOTE — ED PROVIDER NOTE - PHYSICAL EXAMINATION
CONSTITUTIONAL: Patient is awake, alert and oriented x 3. Patient is well appearing and in no acute distress.  HEAD: NCAT  EYES: PERRL bilaterally, EOMI,   NECK: Supple,   LUNGS: CTA B/L, no wheezes, rhonci or rales  HEART: RRR.+S1S2 no murmurs,   ABDOMEN: Soft, non-tender to palpation throughout all four quadrants,   MSK: No edema or calf tenderness b/l, FROM upper and lower ext b/l, (+) right knee ttp, (-) midline neck/spine tenderess to palpation, (+) hematoma to posterior occipital scalp;   SKIN: No rash or lesions  NEURO: No focal deficits, Strength5/5 UE and LE b/l; Sensation intact;

## 2022-03-21 NOTE — ED PROVIDER NOTE - NSFOLLOWUPINSTRUCTIONS_ED_ALL_ED_FT
YOU WERE SEEN IN THE ED FOR: head trauma    WHILE YOU WERE HERE, YOU HAD: a CT of your head and XRay of your right knee and pelvis which were non actionable.  Your CT showed incidental findings which are:  - Slightly increased prominence of right basal ganglia mineralization/calcification, nonspecific.  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   The radiologist recommends a non emergent ultrasound.  Please follow up with your primary care doctor for this issue.    FOR PAIN, YOU MAY TAKE TYLENOL (Acetaminophen).  WHILE HERE YOU RECEIVED 975mg of TYLENOL. FOLLOW THE INSTRUCTIONS ON THE LABEL/CONTAINER.    PLEASE FOLLOW UP WITH YOUR PRIVATE PHYSICIAN DR MENDOZA WITHIN THE NEXT 48 HOURS. BRING COPIES OF YOUR RESULTS.    RETURN TO THE EMERGENCY DEPARTMENT IF YOU EXPERIENCE ANY NEW/CONCERNING/WORSENING SYMPTOMS SUCH AS BUT NOT LIMITED TO: numbness, weakness or tingling in extremities, severe headache, change in vision, double vision, sudden loss of vision, chest pain, shortness of breath or any new concerns.

## 2022-03-21 NOTE — ED PROVIDER NOTE - ATTENDING CONTRIBUTION TO CARE
Attending MD Myers:   I personally have seen and examined this patient.  Physician assistant note reviewed and agree on plan of care and except where noted.  See below for details.     seen in [facility]    81F with PMH/PSH including HTN, HLD, DM, ambulates with walker at baseline, arthritis of R knee presents to the ED brought in by EMS s/p fall.  Reports that she was standing holding onto her walker and a table when she went to sit back in a chair, missed chair and fell backwards.  Reports that she hit her head, ?against walker.  Denies preceding dizziness, weakness, sensory changes.  Denies LOC. Reports that she has chronic pain at her R knee and difficult to say if she is having new pain at the R knee.  Denies chest pain, shortness of breath, abdominal pain, nausea, vomiting, diarrhea, urinary complaints, change in vision, neck pain, back pain.  Denies fevers, recent illness.  A ten (10) point review of systems was negative other than as stated in the HPI or elsewhere in the chart.     Exam:   General: NAD  HENT: head NCAT, airway patent with no blood in oropharynx or dried blood at nares  Eyes: PERRL, EOMI  Lungs: lungs CTAB with good inspiratory effort, no wheezing, no rhonchi, no rales  Cardiac: +S1S2, no m/r/g  GI: abdomen soft with +BS, NT, ND  : no CVAT  MSK: FROM at neck, no tenderness to midline palpation, no stepoffs along length of spine, no gross deformities of extremities, FROM at bilateral hips and knees without erythema or warmth  Neuro: moving all extremities spontaneously with 5/5 strength, sensory grossly intact, no gross neuro deficits  Skin: no abrasions or lacerations noted  Psych: normal mood and affect     A/P: 81F s/p fall, with head trauma, will obtain CTH to eval for traumatic ICH, will obtain XR R knee and pelvis to eval for new bony injury, suspect will see arthritic changes to knee, declined pain control

## 2022-03-21 NOTE — ED PROVIDER NOTE - OBJECTIVE STATEMENT
80 y/o female with PMHx of HTN, HLD, DM presents to the ED BIBA s/p mechanical trip and fall. Patient states that today she was at her assisted living facility. She was trying to sit back in a chair when she accidently missed her chair and fell straight backwards. Admits to head trauma but denies LOC. She normally ambulates with a walker. She has not tried to ambulate since her fall. She denies any pain at this time. No recent illness, headache, fever, chills, chest pain, abdominal pain, n/v/d.

## 2022-03-21 NOTE — ED PROVIDER NOTE - BIRTH SEX
Pt had gastroenteritis around 1 week ago and since then she had been having more nausea. She had diarrhea 1 week ago and lately she had constipation. Pt still has upper abdominal discomfort. On exam. Abdomen: soft, Non tender, non distended. Will check liver function tests.  Pt just finished her po antibiotics. Urine culture came back negative from 04/10/18. Will do another urine culture since urine dip stick showed positive nitrites but all else negative. Follow up in 3 weeks with DEMAR.   Female

## 2022-03-21 NOTE — ED PROVIDER NOTE - NS ED ATTENDING STATEMENT MOD
This was a shared visit with the JOSE ANTONIO. I reviewed and verified the documentation and independently performed the documented:

## 2022-03-21 NOTE — ED ADULT NURSE NOTE - OBJECTIVE STATEMENT
pt 82 yo female via ems to [facility] after mechanical fall from Assisted living pt alert oriented on arrival states going to sit back in chair walker missed seat fell back hit head no LOC no visible bleeding pt states some little discomfort to right outer leg where she hit walker no wound noted and some right side shoulder discomfort pt states when ems picked her up motor sensory intact to extremities

## 2022-03-21 NOTE — ED ADULT NURSE REASSESSMENT NOTE - NS ED NURSE REASSESS COMMENT FT1
Report received from Sammie VU. Pt resting comfortably without complaints & does not appear to be in any acute distress at this time with VSS. Perineal care provided, diaper changed, & assisted pt to change out of hospital gown & into street clothes to prepare for d/c home per [facility] approval. Pt states she is ready for d/c home.

## 2022-06-15 NOTE — ED PROVIDER NOTE - DISPOSITION TYPE
Is This A New Presentation, Or A Follow-Up?: Nail Disorder How Severe Is It?: moderate Additional History: Patient voices concerns over possible nail fungus. DISCHARGE

## 2022-09-27 ENCOUNTER — EMERGENCY (EMERGENCY)
Facility: HOSPITAL | Age: 82
LOS: 1 days | Discharge: HOME | End: 2022-09-27
Attending: EMERGENCY MEDICINE | Admitting: EMERGENCY MEDICINE
Payer: MEDICARE

## 2022-09-27 VITALS
DIASTOLIC BLOOD PRESSURE: 52 MMHG | SYSTOLIC BLOOD PRESSURE: 92 MMHG | HEART RATE: 74 BPM | RESPIRATION RATE: 18 BRPM | OXYGEN SATURATION: 97 % | TEMPERATURE: 98 F

## 2022-09-27 VITALS
WEIGHT: 179.9 LBS | SYSTOLIC BLOOD PRESSURE: 130 MMHG | HEART RATE: 69 BPM | DIASTOLIC BLOOD PRESSURE: 66 MMHG | HEIGHT: 63 IN | OXYGEN SATURATION: 99 % | TEMPERATURE: 98 F | RESPIRATION RATE: 18 BRPM

## 2022-09-27 PROCEDURE — 96374 THER/PROPH/DIAG INJ IV PUSH: CPT

## 2022-09-27 PROCEDURE — 99284 EMERGENCY DEPT VISIT MOD MDM: CPT | Mod: 25

## 2022-09-27 PROCEDURE — 99284 EMERGENCY DEPT VISIT MOD MDM: CPT

## 2022-09-27 RX ORDER — DIAZEPAM 5 MG
2 TABLET ORAL ONCE
Refills: 0 | Status: DISCONTINUED | OUTPATIENT
Start: 2022-09-27 | End: 2022-09-27

## 2022-09-27 RX ORDER — MORPHINE SULFATE 50 MG/1
2 CAPSULE, EXTENDED RELEASE ORAL ONCE
Refills: 0 | Status: COMPLETED | OUTPATIENT
Start: 2022-09-27 | End: 2022-09-27

## 2022-09-27 RX ORDER — SODIUM CHLORIDE 9 MG/ML
3 INJECTION INTRAMUSCULAR; INTRAVENOUS; SUBCUTANEOUS ONCE
Refills: 0 | Status: COMPLETED | OUTPATIENT
Start: 2022-09-27 | End: 2022-09-27

## 2022-09-27 RX ADMIN — Medication 2 MILLIGRAM(S): at 15:21

## 2022-09-27 RX ADMIN — SODIUM CHLORIDE 3 MILLILITER(S): 9 INJECTION INTRAMUSCULAR; INTRAVENOUS; SUBCUTANEOUS at 15:00

## 2022-09-27 NOTE — ED PROVIDER NOTE - OBJECTIVE STATEMENT
Patient came into the ED from assisted living c/o left lower back pain since last night. pain worse with movement. states she's had similar pain in the past that has been relieved in the hospital with morphine. no FUD. no fever. no N/V. she has tried tylenol and a lidocaine patch without relief. no trauma/fall.

## 2022-09-27 NOTE — ED PROVIDER NOTE - PROGRESS NOTE DETAILS
patient sleeping. patient awake and alert, and states her pain had improved and she was able to sleep but it is now returning. morphine ordered. family at bedside states patient has chronic pain and has been seen by many with epiderals, PT, etc. daughter aware of my concern about holding off NSAIDs. recommended to use ice. don't want to d/c with narcotics. patient to go home with daughter and discuss getting PT at assisted living.

## 2022-09-27 NOTE — ED ADULT NURSE NOTE - CHPI ED NUR SYMPTOMS NEG
no bladder dysfunction/no bowel dysfunction/no constipation/no fatigue/no motor function loss/no neck tenderness/no numbness/no tingling

## 2022-09-27 NOTE — ED ADULT TRIAGE NOTE - CHIEF COMPLAINT QUOTE
Patient a/Ox4 BIBA for left flank pain. Has known cyst on her kidney. From [facility]. Denies fall or dysuria.

## 2022-09-27 NOTE — ED PROVIDER NOTE - PATIENT PORTAL LINK FT
You can access the FollowMyHealth Patient Portal offered by [facility] by registering at the following website: [email or web address]. By joining [facility]’s FollowMyHealth portal, you will also be able to view your health information using other applications (apps) compatible with our system.

## 2022-09-27 NOTE — ED ADULT NURSE REASSESSMENT NOTE - NSIMPLEMENTINTERV_GEN_ALL_ED
Implemented All Fall with Harm Risk Interventions:  [place] to call system. Call bell, personal items and telephone within reach. Instruct patient to call for assistance. Room bathroom lighting operational. Non-slip footwear when patient is off stretcher. Physically safe environment: no spills, clutter or unnecessary equipment. Stretcher in lowest position, wheels locked, appropriate side rails in place. Provide visual cue, wrist band, yellow gown, etc. Monitor gait and stability. Monitor for mental status changes and reorient to person, place, and time. Review medications for side effects contributing to fall risk. Reinforce activity limits and safety measures with patient and family. Provide visual clues: red socks.

## 2022-09-27 NOTE — ED ADULT NURSE NOTE - NSIMPLEMENTINTERV_GEN_ALL_ED
Implemented All Universal Safety Interventions:  [place] to call system. Call bell, personal items and telephone within reach. Instruct patient to call for assistance. Room bathroom lighting operational. Non-slip footwear when patient is off stretcher. Physically safe environment: no spills, clutter or unnecessary equipment. Stretcher in lowest position, wheels locked, appropriate side rails in place.

## 2022-09-27 NOTE — ED ADULT TRIAGE NOTE - SOURCE OF INFORMATION
[facility]   Susceptibility      Klebsiella pneumoniae     FELIX    $ 
Patient/EMS

## 2022-09-27 NOTE — ED ADULT NURSE NOTE - CHIEF COMPLAINT QUOTE
Patient a/Ox4 BIBA for left flank pain. Has known cyst on her kidney. From [facility]. Denies fall or dysuria.

## 2022-09-27 NOTE — ED PROVIDER NOTE - NSFOLLOWUPINSTRUCTIONS_ED_ALL_ED_FT
Low Back Strain    WHAT YOU NEED TO KNOW:    Low back strain is an injury to your lower back muscles or tendons. Tendons are strong tissues that connect muscles to bones. The lower back supports most of your body weight and helps you move, twist, and bend.    DISCHARGE INSTRUCTIONS:    Seek care immediately if:   •You hear or feel a pop in your lower back.      •You have increased swelling or pain in your lower back.      •You have trouble moving your legs.      •Your legs are numb.      Call your doctor if:   •You have a fever.      •Your pain does not go away, even after treatment.      •You have questions or concerns about your condition or care.      Medicines: The following medicines may be ordered by your healthcare provider:  •Acetaminophen decreases pain and fever. It is available without a doctor's order. Ask how much to take and how often to take it. Follow directions. Read the labels of all other medicines you are using to see if they also contain acetaminophen, or ask your doctor or pharmacist. Acetaminophen can cause liver damage if not taken correctly.      •NSAIDs, such as ibuprofen, help decrease swelling, pain, and fever. This medicine is available with or without a doctor's order. NSAIDs can cause stomach bleeding or kidney problems in certain people. If you take blood thinner medicine, always ask your healthcare provider if NSAIDs are safe for you. Always read the medicine label and follow directions.      •Muscle relaxers help decrease pain and muscle spasms.      •Prescription pain medicine may be given. Ask your healthcare provider how to take this medicine safely. Some prescription pain medicines contain acetaminophen. Do not take other medicines that contain acetaminophen without talking to your healthcare provider. Too much acetaminophen may cause liver damage. Prescription pain medicine may cause constipation. Ask your healthcare provider how to prevent or treat constipation.       •Take your medicine as directed. Contact your healthcare provider if you think your medicine is not helping or if you have side effects. Tell your provider if you are allergic to any medicine. Keep a list of the medicines, vitamins, and herbs you take. Include the amounts, and when and why you take them. Bring the list or the pill bottles to follow-up visits. Carry your medicine list with you in case of an emergency.      Self-care:   •Rest as directed. You may need to rest in bed for a period of time after your injury. Do not lift heavy objects.      •Apply ice on your back for 15 to 20 minutes every hour or as directed. Use an ice pack, or put crushed ice in a plastic bag. Cover it with a towel. Ice helps prevent tissue damage and decreases swelling and pain.      •Apply heat on your lower back for 20 to 30 minutes every 2 hours for as many days as directed. Heat helps decrease pain and muscle spasms.      •Slowly start to increase your activity as the pain decreases, or as directed.      Prevent another low back strain:   •Use correct body movements. ?Bend at the hips and knees when you  objects. Do not bend from the waist. Use your leg muscles as you lift the load. Do not use your back. Keep the object close to your chest as you lift it. Try not to twist or lift anything above your waist.  How to Lift Items Safely           ?Change your position often when you stand for long periods of time. Rest one foot on a small box or footrest, and then switch to the other foot often.      ?Try not to sit for long periods of time. When you do, sit in a straight-backed chair with your feet flat on the floor.      ?Never reach, pull, or push while you are sitting.      •Warm up before you exercise. Do exercises that strengthen your back muscles. Ask your healthcare provider about the best exercise plan for you.  Warm up and Cool Down            •Maintain a healthy weight. Ask your healthcare provider how much you should weigh. Ask him to help you create a weight loss plan if you are overweight.      Follow up with your doctor as directed: Write down your questions so you remember to ask them during your visits.    **Follow up with your doctor. Take tylenol and lidocaine patch as needed for pain. Use ice packs to your lower back for 10-20 minutes multiple times per day. Discuss with PT at your facility treatment and exercise options to help you with your pain. Return for worsening pain, trouble controlling your urine/stool, any concerns.

## 2022-09-27 NOTE — ED PROVIDER NOTE - MUSCULOSKELETAL, MLM
Spine appears normal, range of motion is not limited. no midline lumbar tenderness. +mild left lumbar/lower thoracic paraspinal muscular tenderness. no bruising noted.

## 2022-09-27 NOTE — ED ADULT NURSE REASSESSMENT NOTE - NS ED NURSE REASSESS COMMENT FT1
Pt resting comfortably in bed at this time, offers no complaints.  Lower back pain resolved at this time.  Denies any chest pain or SOB.  No n/v/d.  Pt ambulated to bathroom with walker and standby assist- tolerated well, steady gait noted.  Pending dc home, daughter at bedside.  Maintain comfort and safety.

## 2022-10-13 ENCOUNTER — APPOINTMENT (OUTPATIENT)
Dept: ORTHOPEDIC SURGERY | Facility: CLINIC | Age: 82
End: 2022-10-13

## 2022-12-29 NOTE — ED ADULT NURSE NOTE - NSFALLRSKHRMRISKTYPE_ED_ALL_ED
other Libtayo Pregnancy And Lactation Text: This medication is contraindicated in pregnancy and when breast feeding.

## 2024-10-30 NOTE — ED ADULT NURSE NOTE - GASTROINTESTINAL WDL
It was good to see you  Please let me know if want to do therapy   LEFT tonsillar stone likely. Can see dentist  Make sure to stay hydrated  Can naturally come out    Try to take breaks throughout the day  Have time to self, take walks  Monitor if any symptoms related to foods or timing of meals with metallic taste  See me for annual exam or sooner if concerns  Thank you  Dr. Sellers   Abdomen soft, nontender, nondistended, bowel sounds present in all 4 quadrants.